# Patient Record
Sex: MALE | Race: ASIAN | NOT HISPANIC OR LATINO
[De-identification: names, ages, dates, MRNs, and addresses within clinical notes are randomized per-mention and may not be internally consistent; named-entity substitution may affect disease eponyms.]

---

## 2023-12-18 ENCOUNTER — APPOINTMENT (OUTPATIENT)
Dept: CARDIOLOGY | Facility: CLINIC | Age: 58
End: 2023-12-18
Payer: MEDICAID

## 2023-12-18 ENCOUNTER — NON-APPOINTMENT (OUTPATIENT)
Age: 58
End: 2023-12-18

## 2023-12-18 VITALS
HEART RATE: 74 BPM | DIASTOLIC BLOOD PRESSURE: 83 MMHG | RESPIRATION RATE: 18 BRPM | BODY MASS INDEX: 18.16 KG/M2 | WEIGHT: 124 LBS | TEMPERATURE: 96.3 F | SYSTOLIC BLOOD PRESSURE: 128 MMHG | HEIGHT: 69.29 IN | OXYGEN SATURATION: 96 %

## 2023-12-18 DIAGNOSIS — F17.200 NICOTINE DEPENDENCE, UNSPECIFIED, UNCOMPLICATED: ICD-10-CM

## 2023-12-18 PROBLEM — Z00.00 ENCOUNTER FOR PREVENTIVE HEALTH EXAMINATION: Status: ACTIVE | Noted: 2023-12-18

## 2023-12-18 PROCEDURE — 93306 TTE W/DOPPLER COMPLETE: CPT

## 2023-12-18 PROCEDURE — 93000 ELECTROCARDIOGRAM COMPLETE: CPT

## 2023-12-18 PROCEDURE — 99204 OFFICE O/P NEW MOD 45 MIN: CPT | Mod: 25

## 2023-12-18 NOTE — HISTORY OF PRESENT ILLNESS
[FreeTextEntry1] : 58 year old M with no significant PMH who presents for cardiac evaluation for tachyarrhythmia.   On 12/9/23, pt had palpitations and diaphoresis. He went to see Dr. Turner that day, EKG showed narrow complex tachycardia to 140 (SVT -- AT vs. AVNRT). Pt was prescribed a medication by Dr. Turner. He took it with resolution of symptoms so he did not see cardiology right away. 1-2 days later, he felt palpitations again and noticed his HR in 140s so he took another dose of the medication with improvement of symptoms. He states this has been happening over the past 2 years but they are not long-lasting so he doesn't notice it much. He drinks 1-2 cups of coffee daily. He also drinks 1 glass of cognac and a few beers daily. No chest pain, SOB, orthopnea, PND, LE edema, dizziness, or LOC.

## 2023-12-18 NOTE — ASSESSMENT
[FreeTextEntry1] : 58 year old M with no significant PMH who presents for cardiac evaluation for tachyarrhythmia.   On 12/9/23, pt had palpitations and diaphoresis. He went to see Dr. Turner that day, EKG showed narrow complex tachycardia to 140 (SVT -- AT vs. AVNRT). Pt was prescribed a medication by Dr. Turner. He took it with resolution of symptoms so he did not see cardiology right away. 1-2 days later, he felt palpitations again and noticed his HR in 140s so he took another dose of the medication with improvement of symptoms. He states this has been happening over the past 2 years but they are not long-lasting so he doesn't notice it much. He drinks 1-2 cups of coffee daily. He also drinks 1 glass of cognac and a few beers daily. No chest pain, SOB, orthopnea, PND, LE edema, dizziness, or LOC.  1) pSVT - EKG today showed sinus rhythm without significant abnormalities - Pt underwent TTE 12/18/23 which showed normal LV function, normal RV size/function, normal atrial size, and no significant valvular disease. 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  - I advised pt to undergo 1 week event monitor - Continue atenolol as prescribed by Dr. Turner for now - I advised pt to cut down on caffeine and alcohol intake as both can predispose him to having SVT  2) Follow-up, pending event monitor

## 2024-01-07 ENCOUNTER — NON-APPOINTMENT (OUTPATIENT)
Age: 59
End: 2024-01-07

## 2024-02-15 ENCOUNTER — NON-APPOINTMENT (OUTPATIENT)
Age: 59
End: 2024-02-15

## 2024-02-15 ENCOUNTER — APPOINTMENT (OUTPATIENT)
Dept: CARDIOLOGY | Facility: CLINIC | Age: 59
End: 2024-02-15
Payer: MEDICAID

## 2024-02-15 VITALS
DIASTOLIC BLOOD PRESSURE: 85 MMHG | SYSTOLIC BLOOD PRESSURE: 131 MMHG | RESPIRATION RATE: 16 BRPM | HEART RATE: 72 BPM | BODY MASS INDEX: 18.16 KG/M2 | WEIGHT: 124 LBS | OXYGEN SATURATION: 98 % | TEMPERATURE: 96.8 F

## 2024-02-15 PROCEDURE — 99214 OFFICE O/P EST MOD 30 MIN: CPT | Mod: 25

## 2024-02-15 PROCEDURE — 93000 ELECTROCARDIOGRAM COMPLETE: CPT

## 2024-02-15 NOTE — HISTORY OF PRESENT ILLNESS
[FreeTextEntry1] : He is trying to take atenolol more consistently since our last visit. He states he currently feels better and hasn't had palpitations in a while. He is drinking less alcohol but still drinking 1-2 cups of coffee daily. No CP, SOB, dizziness, orthopnea, PND, LE edema, or LOC.  12/18/23 - On 12/9/23, pt had palpitations and diaphoresis. He went to see Dr. Turner that day, EKG showed narrow complex tachycardia to 140 (SVT -- AT vs. AVNRT). Pt was prescribed a medication by Dr. Turner. He took it with resolution of symptoms so he did not see cardiology right away. 1-2 days later, he felt palpitations again and noticed his HR in 140s so he took another dose of the medication with improvement of symptoms. He states this has been happening over the past 2 years but they are not long-lasting so he doesn't notice it much. He drinks 1-2 cups of coffee daily. He also drinks 1 glass of cognac and a few beers daily. No chest pain, SOB, orthopnea, PND, LE edema, dizziness, or LOC.

## 2024-02-15 NOTE — REASON FOR VISIT
[Symptom and Test Evaluation] : symptom and test evaluation [FreeTextEntry1] : 59 year old M with history of pSVT who presents for f/u.  TTE 12/18/23 showed normal LV function, normal RV size/function, normal atrial size, and no significant valvular disease. 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 1 week event monitor showed overall sinus rhythm but 4 episodes of SVT (fastest 183 bpm, longest for 3 hours and 6 minutes).

## 2024-02-15 NOTE — ASSESSMENT
[FreeTextEntry1] : 59 year old M with history of pSVT who presents for f/u.  He is trying to take atenolol more consistently since our last visit. He states he currently feels better and hasn't had palpitations in a while. He is drinking less alcohol but still drinking 1-2 cups of coffee daily. No CP, SOB, dizziness, orthopnea, PND, LE edema, or LOC.  1) pSVT - EKG 2/15/24 showed sinus rhythm without significant abnormalities - Pt underwent TTE 12/18/23 which showed normal LV function, normal RV size/function, normal atrial size, and no significant valvular disease. 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 - 1 week event monitor showed overall sinus rhythm but 4 episodes of SVT (fastest 183 bpm, longest for 3 hours and 6 minutes) - I advised pt to continue atenolol 25mg once daily. May take additional if he has more palpitations.  - Discussed to cut down on caffeine and alcohol intake - Educated on vagal maneuvers and ED precautions  2) Follow-up, 3-4 months or sooner if symptomatic

## 2024-06-07 ENCOUNTER — NON-APPOINTMENT (OUTPATIENT)
Age: 59
End: 2024-06-07

## 2024-06-07 ENCOUNTER — APPOINTMENT (OUTPATIENT)
Dept: CARDIOLOGY | Facility: CLINIC | Age: 59
End: 2024-06-07
Payer: MEDICAID

## 2024-06-07 VITALS
RESPIRATION RATE: 16 BRPM | OXYGEN SATURATION: 96 % | DIASTOLIC BLOOD PRESSURE: 84 MMHG | BODY MASS INDEX: 17.87 KG/M2 | WEIGHT: 122 LBS | SYSTOLIC BLOOD PRESSURE: 149 MMHG | HEART RATE: 62 BPM

## 2024-06-07 DIAGNOSIS — I47.10 SUPRAVENTRICULAR TACHYCARDIA, UNSPECIFIED: ICD-10-CM

## 2024-06-07 DIAGNOSIS — R00.2 PALPITATIONS: ICD-10-CM

## 2024-06-07 PROCEDURE — G2211 COMPLEX E/M VISIT ADD ON: CPT | Mod: NC,1L

## 2024-06-07 PROCEDURE — 93000 ELECTROCARDIOGRAM COMPLETE: CPT

## 2024-06-07 PROCEDURE — 99214 OFFICE O/P EST MOD 30 MIN: CPT | Mod: 25

## 2024-06-07 RX ORDER — ATENOLOL 25 MG/1
25 TABLET ORAL
Qty: 180 | Refills: 1 | Status: ACTIVE | COMMUNITY
Start: 2024-02-15 | End: 1900-01-01

## 2024-06-07 NOTE — REASON FOR VISIT
[FreeTextEntry1] : 59 year old M smoker with history of pSVT who presents for f/u.  TTE 12/18/23 showed normal LV function, normal RV size/function, normal atrial size, and no significant valvular disease. 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 1 week event monitor showed overall sinus rhythm but 4 episodes of SVT (fastest 183 bpm, longest for 3 hours and 6 minutes).

## 2024-06-07 NOTE — HISTORY OF PRESENT ILLNESS
[FreeTextEntry1] : He has been stable since last visit. He has intermittent palpitations, approximately once every 2-3 weeks, lasting 10-20 minutes at a time. Last time was 2 weeks ago when he woke up in the morning and started to feel palpitations, measured his HR was 140s. He took atenolol and it went away. No CP, SOB, dizziness, orthopnea, PND, LE edema, or LOC. Drinking 1 small shot of cognac every night. Still smoking.  2/15/24 - He is trying to take atenolol more consistently since our last visit. He states he currently feels better and hasn't had palpitations in a while. He is drinking less alcohol but still drinking 1-2 cups of coffee daily. No CP, SOB, dizziness, orthopnea, PND, LE edema, or LOC.  12/18/23 - On 12/9/23, pt had palpitations and diaphoresis. He went to see Dr. Turner that day, EKG showed narrow complex tachycardia to 140 (SVT -- AT vs. AVNRT). Pt was prescribed a medication by Dr. Turner. He took it with resolution of symptoms so he did not see cardiology right away. 1-2 days later, he felt palpitations again and noticed his HR in 140s so he took another dose of the medication with improvement of symptoms. He states this has been happening over the past 2 years but they are not long-lasting so he doesn't notice it much. He drinks 1-2 cups of coffee daily. He also drinks 1 glass of cognac and a few beers daily. No chest pain, SOB, orthopnea, PND, LE edema, dizziness, or LOC.

## 2024-06-07 NOTE — ASSESSMENT
[FreeTextEntry1] : 59 year old M smoker with history of pSVT who presents for f/u.  He has been stable since last visit. He has intermittent palpitations, approximately once every 2-3 weeks, lasting 10-20 minutes at a time. Last time was 2 weeks ago when he woke up in the morning and started to feel palpitations, measured his HR was 140s. He took atenolol and it went away. No CP, SOB, dizziness, orthopnea, PND, LE edema, or LOC. Drinking 1 small shot of cognac every night. Still smoking.  1) pSVT 2) Palpitation - EKG 6/7/24 showed sinus rhythm without significant abnormalities - Pt underwent TTE 12/18/23 which showed normal LV function, normal RV size/function, normal atrial size, and no significant valvular disease. 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 - 1 week event monitor showed overall sinus rhythm but 4 episodes of SVT (fastest 183 bpm, longest for 3 hours and 6 minutes) - I advised pt to increase atenolol 25mg to TWICE daily. We discussed if he has worsening/persistent symptoms, he may need ablation in the future. - Discussed to cut down on caffeine and alcohol intake - Educated on vagal maneuvers and ED precautions  3) Follow-up, 3-4 months or sooner if symptomatic

## 2024-09-05 ENCOUNTER — NON-APPOINTMENT (OUTPATIENT)
Age: 59
End: 2024-09-05

## 2024-09-05 ENCOUNTER — APPOINTMENT (OUTPATIENT)
Dept: CARDIOLOGY | Facility: CLINIC | Age: 59
End: 2024-09-05
Payer: MEDICAID

## 2024-09-05 VITALS
DIASTOLIC BLOOD PRESSURE: 75 MMHG | HEART RATE: 64 BPM | BODY MASS INDEX: 18.16 KG/M2 | WEIGHT: 124 LBS | SYSTOLIC BLOOD PRESSURE: 110 MMHG | RESPIRATION RATE: 16 BRPM | OXYGEN SATURATION: 99 %

## 2024-09-05 DIAGNOSIS — R00.2 PALPITATIONS: ICD-10-CM

## 2024-09-05 DIAGNOSIS — I47.10 SUPRAVENTRICULAR TACHYCARDIA, UNSPECIFIED: ICD-10-CM

## 2024-09-05 PROCEDURE — G2211 COMPLEX E/M VISIT ADD ON: CPT | Mod: NC

## 2024-09-05 PROCEDURE — 99214 OFFICE O/P EST MOD 30 MIN: CPT | Mod: 25

## 2024-09-05 PROCEDURE — 93000 ELECTROCARDIOGRAM COMPLETE: CPT

## 2024-09-05 NOTE — ASSESSMENT
[FreeTextEntry1] : 59 year old M smoker with history of pSVT who presents for f/u.  1) pSVT 2) Palpitation - EKG 9/5/24 showed sinus rhythm without significant abnormalities - Pt underwent TTE 12/18/23 which showed normal LV function, normal RV size/function, normal atrial size, and no significant valvular disease. 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 - 1 week event monitor showed overall sinus rhythm but 4 episodes of SVT (fastest 183 bpm, longest for 3 hours and 6 minutes) - Continue atenolol 25mg once daily. He may take additional dose if feeling more palpitations. We discussed that he may need ablation in the future if symptoms recur despite medication - Discussed to cut down on caffeine and alcohol intake - Educated on vagal maneuvers and ED precautions  3) Follow-up, 3-4 months or sooner if symptomatic

## 2024-09-05 NOTE — REASON FOR VISIT
[Symptom and Test Evaluation] : symptom and test evaluation [Arrhythmia/ECG Abnorrmalities] : arrhythmia/ECG abnormalities [FreeTextEntry1] : 59 year old M smoker with history of pSVT who presents for f/u.  TTE 12/18/23 showed normal LV function, normal RV size/function, normal atrial size, and no significant valvular disease. 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 1 week event monitor showed overall sinus rhythm but 4 episodes of SVT (fastest 183 bpm, longest for 3 hours and 6 minutes).

## 2024-09-05 NOTE — HISTORY OF PRESENT ILLNESS
[FreeTextEntry1] : He remains stable since last visit. He now has rare palpitations while taking atenolol 25mg once daily. If he feels more palpitations, he would take additional dose. Pt feels like he has minimal symptoms at this time and rarely requires extra dose of atenolol.  6/7/24 - He has been stable since last visit. He has intermittent palpitations, approximately once every 2-3 weeks, lasting 10-20 minutes at a time. Last time was 2 weeks ago when he woke up in the morning and started to feel palpitations, measured his HR was 140s. He took atenolol and it went away. No CP, SOB, dizziness, orthopnea, PND, LE edema, or LOC. Drinking 1 small shot of cognac every night. Still smoking.  2/15/24 - He is trying to take atenolol more consistently since our last visit. He states he currently feels better and hasn't had palpitations in a while. He is drinking less alcohol but still drinking 1-2 cups of coffee daily. No CP, SOB, dizziness, orthopnea, PND, LE edema, or LOC.  12/18/23 - On 12/9/23, pt had palpitations and diaphoresis. He went to see Dr. Turner that day, EKG showed narrow complex tachycardia to 140 (SVT -- AT vs. AVNRT). Pt was prescribed a medication by Dr. Turner. He took it with resolution of symptoms so he did not see cardiology right away. 1-2 days later, he felt palpitations again and noticed his HR in 140s so he took another dose of the medication with improvement of symptoms. He states this has been happening over the past 2 years but they are not long-lasting so he doesn't notice it much. He drinks 1-2 cups of coffee daily. He also drinks 1 glass of cognac and a few beers daily. No chest pain, SOB, orthopnea, PND, LE edema, dizziness, or LOC.

## 2025-01-10 ENCOUNTER — APPOINTMENT (OUTPATIENT)
Dept: CARDIOLOGY | Facility: CLINIC | Age: 60
End: 2025-01-10
Payer: MEDICAID

## 2025-01-10 ENCOUNTER — NON-APPOINTMENT (OUTPATIENT)
Age: 60
End: 2025-01-10

## 2025-01-10 VITALS
DIASTOLIC BLOOD PRESSURE: 86 MMHG | WEIGHT: 134 LBS | BODY MASS INDEX: 19.62 KG/M2 | SYSTOLIC BLOOD PRESSURE: 147 MMHG | OXYGEN SATURATION: 98 % | HEART RATE: 74 BPM | RESPIRATION RATE: 18 BRPM

## 2025-01-10 DIAGNOSIS — R00.2 PALPITATIONS: ICD-10-CM

## 2025-01-10 DIAGNOSIS — I47.10 SUPRAVENTRICULAR TACHYCARDIA, UNSPECIFIED: ICD-10-CM

## 2025-01-10 PROCEDURE — G2211 COMPLEX E/M VISIT ADD ON: CPT | Mod: NC

## 2025-01-10 PROCEDURE — 93000 ELECTROCARDIOGRAM COMPLETE: CPT

## 2025-01-10 PROCEDURE — 99214 OFFICE O/P EST MOD 30 MIN: CPT

## 2025-04-30 ENCOUNTER — APPOINTMENT (OUTPATIENT)
Dept: CARDIOLOGY | Facility: CLINIC | Age: 60
End: 2025-04-30
Payer: MEDICAID

## 2025-04-30 VITALS
DIASTOLIC BLOOD PRESSURE: 75 MMHG | BODY MASS INDEX: 19.62 KG/M2 | SYSTOLIC BLOOD PRESSURE: 111 MMHG | OXYGEN SATURATION: 98 % | HEART RATE: 75 BPM | RESPIRATION RATE: 18 BRPM | WEIGHT: 134 LBS

## 2025-04-30 DIAGNOSIS — I47.10 SUPRAVENTRICULAR TACHYCARDIA, UNSPECIFIED: ICD-10-CM

## 2025-04-30 DIAGNOSIS — R00.2 PALPITATIONS: ICD-10-CM

## 2025-04-30 PROCEDURE — 99214 OFFICE O/P EST MOD 30 MIN: CPT

## 2025-04-30 PROCEDURE — G2211 COMPLEX E/M VISIT ADD ON: CPT | Mod: NC

## 2025-07-09 ENCOUNTER — APPOINTMENT (OUTPATIENT)
Dept: ELECTROPHYSIOLOGY | Facility: CLINIC | Age: 60
End: 2025-07-09
Payer: MEDICAID

## 2025-07-09 VITALS
BODY MASS INDEX: 19.77 KG/M2 | HEART RATE: 66 BPM | OXYGEN SATURATION: 99 % | WEIGHT: 135 LBS | SYSTOLIC BLOOD PRESSURE: 132 MMHG | DIASTOLIC BLOOD PRESSURE: 84 MMHG | RESPIRATION RATE: 18 BRPM

## 2025-07-09 PROCEDURE — 93000 ELECTROCARDIOGRAM COMPLETE: CPT

## 2025-07-09 PROCEDURE — 99204 OFFICE O/P NEW MOD 45 MIN: CPT

## 2025-07-09 PROCEDURE — G2211 COMPLEX E/M VISIT ADD ON: CPT | Mod: NC

## 2025-07-18 ENCOUNTER — APPOINTMENT (OUTPATIENT)
Dept: CARDIOLOGY | Facility: CLINIC | Age: 60
End: 2025-07-18
Payer: MEDICAID

## 2025-07-18 VITALS
WEIGHT: 133 LBS | OXYGEN SATURATION: 100 % | HEART RATE: 63 BPM | DIASTOLIC BLOOD PRESSURE: 82 MMHG | RESPIRATION RATE: 14 BRPM | BODY MASS INDEX: 19.48 KG/M2 | SYSTOLIC BLOOD PRESSURE: 127 MMHG

## 2025-07-18 PROCEDURE — G2211 COMPLEX E/M VISIT ADD ON: CPT | Mod: NC

## 2025-07-18 PROCEDURE — 99214 OFFICE O/P EST MOD 30 MIN: CPT

## 2025-08-14 ENCOUNTER — NON-APPOINTMENT (OUTPATIENT)
Age: 60
End: 2025-08-14